# Patient Record
(demographics unavailable — no encounter records)

---

## 2025-02-28 NOTE — HISTORY OF PRESENT ILLNESS
[Currently Active] : currently active [TextBox_4] : No vb, exercises.    [Mammogramdate] : 5/2024 [PapSmeardate] : 11/2023 [TextBox_31] : nml hpv- [BoneDensityDate] : 6/2021 [TextBox_37] : normal fu 5 yrs [ColonoscopyDate] : due